# Patient Record
Sex: FEMALE | Race: WHITE | NOT HISPANIC OR LATINO | URBAN - METROPOLITAN AREA
[De-identification: names, ages, dates, MRNs, and addresses within clinical notes are randomized per-mention and may not be internally consistent; named-entity substitution may affect disease eponyms.]

---

## 2023-05-05 ENCOUNTER — EMERGENCY (EMERGENCY)
Facility: HOSPITAL | Age: 73
LOS: 1 days | Discharge: HOME | End: 2023-05-05
Attending: EMERGENCY MEDICINE
Payer: MEDICARE

## 2023-05-05 VITALS
WEIGHT: 143.96 LBS | SYSTOLIC BLOOD PRESSURE: 103 MMHG | TEMPERATURE: 97 F | OXYGEN SATURATION: 95 % | RESPIRATION RATE: 18 BRPM | HEART RATE: 65 BPM | DIASTOLIC BLOOD PRESSURE: 66 MMHG

## 2023-05-05 DIAGNOSIS — K62.5 HEMORRHAGE OF ANUS AND RECTUM: ICD-10-CM

## 2023-05-05 LAB
ALBUMIN SERPL ELPH-MCNC: 4.3 G/DL — SIGNIFICANT CHANGE UP (ref 3.3–5.2)
ALP SERPL-CCNC: 85 U/L — SIGNIFICANT CHANGE UP (ref 40–120)
ALT FLD-CCNC: 38 U/L — HIGH
ANION GAP SERPL CALC-SCNC: 12 MMOL/L — SIGNIFICANT CHANGE UP (ref 5–17)
AST SERPL-CCNC: 44 U/L — HIGH
BASOPHILS # BLD AUTO: 0.03 K/UL — SIGNIFICANT CHANGE UP (ref 0–0.2)
BASOPHILS NFR BLD AUTO: 0.1 % — SIGNIFICANT CHANGE UP (ref 0–2)
BILIRUB SERPL-MCNC: 0.5 MG/DL — SIGNIFICANT CHANGE UP (ref 0.4–2)
BLD GP AB SCN SERPL QL: SIGNIFICANT CHANGE UP
BUN SERPL-MCNC: 27.3 MG/DL — HIGH (ref 8–20)
CALCIUM SERPL-MCNC: 9.6 MG/DL — SIGNIFICANT CHANGE UP (ref 8.4–10.5)
CHLORIDE SERPL-SCNC: 99 MMOL/L — SIGNIFICANT CHANGE UP (ref 96–108)
CO2 SERPL-SCNC: 26 MMOL/L — SIGNIFICANT CHANGE UP (ref 22–29)
CREAT SERPL-MCNC: 0.61 MG/DL — SIGNIFICANT CHANGE UP (ref 0.5–1.3)
EGFR: 94 ML/MIN/1.73M2 — SIGNIFICANT CHANGE UP
EOSINOPHIL # BLD AUTO: 0.04 K/UL — SIGNIFICANT CHANGE UP (ref 0–0.5)
EOSINOPHIL NFR BLD AUTO: 0.2 % — SIGNIFICANT CHANGE UP (ref 0–6)
GLUCOSE SERPL-MCNC: 99 MG/DL — SIGNIFICANT CHANGE UP (ref 70–99)
HCT VFR BLD CALC: 35.3 % — SIGNIFICANT CHANGE UP (ref 34.5–45)
HGB BLD-MCNC: 12.2 G/DL — SIGNIFICANT CHANGE UP (ref 11.5–15.5)
IMM GRANULOCYTES NFR BLD AUTO: 0.8 % — SIGNIFICANT CHANGE UP (ref 0–0.9)
LYMPHOCYTES # BLD AUTO: 10.4 % — LOW (ref 13–44)
LYMPHOCYTES # BLD AUTO: 2.28 K/UL — SIGNIFICANT CHANGE UP (ref 1–3.3)
MCHC RBC-ENTMCNC: 30.9 PG — SIGNIFICANT CHANGE UP (ref 27–34)
MCHC RBC-ENTMCNC: 34.6 GM/DL — SIGNIFICANT CHANGE UP (ref 32–36)
MCV RBC AUTO: 89.4 FL — SIGNIFICANT CHANGE UP (ref 80–100)
MONOCYTES # BLD AUTO: 1.45 K/UL — HIGH (ref 0–0.9)
MONOCYTES NFR BLD AUTO: 6.6 % — SIGNIFICANT CHANGE UP (ref 2–14)
NEUTROPHILS # BLD AUTO: 17.98 K/UL — HIGH (ref 1.8–7.4)
NEUTROPHILS NFR BLD AUTO: 81.9 % — HIGH (ref 43–77)
OB PNL STL: POSITIVE
PLATELET # BLD AUTO: 186 K/UL — SIGNIFICANT CHANGE UP (ref 150–400)
POTASSIUM SERPL-MCNC: 4.3 MMOL/L — SIGNIFICANT CHANGE UP (ref 3.5–5.3)
POTASSIUM SERPL-SCNC: 4.3 MMOL/L — SIGNIFICANT CHANGE UP (ref 3.5–5.3)
PROT SERPL-MCNC: 6.7 G/DL — SIGNIFICANT CHANGE UP (ref 6.6–8.7)
RBC # BLD: 3.95 M/UL — SIGNIFICANT CHANGE UP (ref 3.8–5.2)
RBC # FLD: 12.8 % — SIGNIFICANT CHANGE UP (ref 10.3–14.5)
SODIUM SERPL-SCNC: 137 MMOL/L — SIGNIFICANT CHANGE UP (ref 135–145)
WBC # BLD: 21.95 K/UL — HIGH (ref 3.8–10.5)
WBC # FLD AUTO: 21.95 K/UL — HIGH (ref 3.8–10.5)

## 2023-05-05 PROCEDURE — 36415 COLL VENOUS BLD VENIPUNCTURE: CPT

## 2023-05-05 PROCEDURE — 85025 COMPLETE CBC W/AUTO DIFF WBC: CPT

## 2023-05-05 PROCEDURE — 80053 COMPREHEN METABOLIC PANEL: CPT

## 2023-05-05 PROCEDURE — 86850 RBC ANTIBODY SCREEN: CPT

## 2023-05-05 PROCEDURE — 99284 EMERGENCY DEPT VISIT MOD MDM: CPT

## 2023-05-05 PROCEDURE — 82272 OCCULT BLD FECES 1-3 TESTS: CPT

## 2023-05-05 PROCEDURE — 99283 EMERGENCY DEPT VISIT LOW MDM: CPT

## 2023-05-05 PROCEDURE — 86900 BLOOD TYPING SEROLOGIC ABO: CPT

## 2023-05-05 PROCEDURE — 86901 BLOOD TYPING SEROLOGIC RH(D): CPT

## 2023-05-05 NOTE — ED STATDOCS - OBJECTIVE STATEMENT
71 y/o female with PMHx of HTN on Losartan 50mg Amlodipine 10mg, HLD, presents to the ED c/o rectal bleeding. Pt states yesterday she had episode of bloody diarrhea, woke up today and again had another episode of rectal bleeding, called her PMD who advised her to go to the ED, also made appointment with GI for next Monday. Pt notes mild abdominal pain. Pt denies fevers, lightheadedness, dizziness, SOB, palpitations.  Pt take baby aspirin daily, no blood thinner or other anti-platelet therapy. Pt denies past abdominal surgeries. Pt denies recent travel, denies recent antibiotics use. Pt with hx of one blood transfusion secondary to trauma. Last colonoscopy was about 7 years ago. 71 y/o female with PMHx of HTN on Losartan 50mg Amlodipine 10mg, HLD, presents to the ED c/o rectal bleeding. Pt states yesterday she had episodes of bloody diarrhea, woke up today and again had another episode of rectal bleeding, called her PMD who advised her to go to the ED, also made appointment with GI for next Monday (in [place]). Pt notes mild abdominal pain. Pt denies fevers, lightheadedness, dizziness, SOB, palpitations.  Pt take baby aspirin daily, no blood thinner or other anti-platelet therapy. Pt denies past abdominal surgeries. Pt denies recent travel, denies recent antibiotics use. Pt with hx of one blood transfusion secondary to trauma. Last colonoscopy was about 7 years ago.

## 2023-05-05 NOTE — ED STATDOCS - PATIENT PORTAL LINK FT
You can access the FollowMyHealth Patient Portal offered by [facility] by registering at the following website: [email or web address]. By joining [employer]’s FollowMyHealth portal, you will also be able to view your health information using other applications (apps) compatible with our system.

## 2023-05-05 NOTE — CONSULT NOTE ADULT - PROBLEM SELECTOR RECOMMENDATION 2
Seems to have subsided   likely  infectious gastroenteritis, less likely ischemic colitis- now resolving    low fiber diet   If diarrhea resumes, pt should be on clear liquids  and obtain stool studies  for GI PCR, giardia, ova and parasites, C diff   Ct scan ordered. Should be sent home on augmentin X 7 days.  Has appointment with [facility] GI on monday already scheduled

## 2023-05-05 NOTE — CONSULT NOTE ADULT - SUBJECTIVE AND OBJECTIVE BOX
Patient is a 72y old  Female who presents with a chief complaint of     HPI:  Patient with hx of HTN. States yesterday, she had diarrhea and vomiting at the same time. NO hematemesis. Diarrhea was "uncontrollable ". More than 10 BM. Diarrhea became mixed with blood. Mild cramping. NO fevers. Did not eat anything unusual. No sick contacts, no recent travel or antibiotics. Had no diarrhea overnight. This am one semiformed stool , wiped and saw blood. NO further vomiting. Last colon 7 years ago in [facility] was negative as per pt.  In, ER, pt with leukocytosis of 21,000      REVIEW OF SYSTEMS:  Constitutional: No fever, weight loss or fatigue  ENMT:  No difficulty hearing, tinnitus, vertigo; No sinus or throat pain  Respiratory: No cough, wheezing, chills or hemoptysis  Cardiovascular: No chest pain, palpitations, dizziness or leg swelling  Gastrointestinal: No abdominal or epigastric pain. No nausea, vomiting or hematemesis; + diarrhea or constipation. +r hematochezia.  Skin: No itching, burning, rashes or lesions   Musculoskeletal: No joint pain or swelling; No muscle, back or extremity pain    PAST MEDICAL & SURGICAL HISTORY:  HTN (hypertension)      No significant past surgical history          FAMILY HISTORY:      SOCIAL HISTORY:  Smoking Status: [ ] Current, [ ] Former, [ ] Never  Pack Years:  [  ] EtOH-no  [  ] IVDA    MEDICATIONS:  MEDICATIONS  (STANDING):    MEDICATIONS  (PRN):      Allergies    No Known Allergies    Intolerances        Vital Signs Last 24 Hrs  T(C): 36.3 (05 May 2023 12:56), Max: 36.3 (05 May 2023 12:56)  T(F): 97.3 (05 May 2023 12:56), Max: 97.3 (05 May 2023 12:56)  HR: 65 (05 May 2023 12:56) (65 - 65)  BP: 103/66 (05 May 2023 12:56) (103/66 - 103/66)  BP(mean): --  RR: 18 (05 May 2023 12:56) (18 - 18)  SpO2: 95% (05 May 2023 12:56) (95% - 95%)    Parameters below as of 05 May 2023 12:56  Patient On (Oxygen Delivery Method): room air            PHYSICAL EXAM:    General:  in no acute distress  HEENT: MMM, conjunctiva and sclera clear  H-RRR  L-CTA  Gastrointestinal: Soft, non-tender non-distended; Normal bowel sounds; No rebound or guarding  Extremities: Normal range of motion, No clubbing, cyanosis or edema  Neurological: Alert and oriented x3  Skin: Warm and dry. No obvious rash  rectal-  unable to do rectal as pt is in open area of the ER. As per ER note, pt had blood on rectal exam       LABS:                        12.2   21.95 )-----------( 186      ( 05 May 2023 13:42 )             35.3     05 May 2023 13:42    137    |  99     |  27.3   ----------------------------<  99     4.3     |  26.0   |  0.61     Ca    9.6        05 May 2023 13:42    TPro  6.7    /  Alb  4.3    /  TBili  0.5    /  DBili  x      /  AST  44     /  ALT  38     /  AlkPhos  85     / Amylase x      /Lipase x      05 May 2023 13:42              RADIOLOGY & ADDITIONAL STUDIES:

## 2023-05-05 NOTE — ED ADULT TRIAGE NOTE - NS ED TRIAGE AVPU SCALE
I have reviewed discharge instructions with the patient. The patient verbalized understanding.
Alert-The patient is alert, awake and responds to voice. The patient is oriented to time, place, and person. The triage nurse is able to obtain subjective information.

## 2023-05-05 NOTE — ED ADULT NURSE NOTE - OBJECTIVE STATEMENT
Pt comes in complaining of BRBPR x 1 day. Pt states yesterday she had multiple episodes of bloody stool for 2.5 hours. Pt denies dizziness, pain, CP, SOB.

## 2023-05-05 NOTE — ED STATDOCS - PHYSICAL EXAMINATION
Gen: Non-toxic appearing in NAD  Eyes: Pink conjunctiva, no scleral icterus,  Neck: supple, no lymphadenopathy  Card: regular rate and rhythm, no obvious murmur  Resp: Lungs clear bilaterally   Abd: soft, dull to percussion, non-distended, mild LUQ tenderness, no rebound, rigidity, or guarding   Rectal: chaperoned by CNA: Yesenia Candelario: external hemorrhoid, no tenderness, no masses, gross blood in rectum

## 2023-05-05 NOTE — ED STATDOCS - NS_ ATTENDINGSCRIBEDETAILS _ED_A_ED_FT
I, Israel Fierro, performed the initial face to face bedside interview with this patient regarding history of present illness, review of symptoms and relevant past medical, social and family history.  I completed an independent physical examination.  I was the provider who initially evaluated this patient.  The history, relevant review of systems, past medical and surgical history, medical decision making, and physical examination was documented by the scribe in my presence and I attest to the accuracy of the documentation. Follow-up on ordered tests (ie labs, radiologic studies) and re-evaluation of the patient's status has been communicated to the ACP.  Disposition of the patient will be based on test outcome and response to ED interventions.

## 2023-05-05 NOTE — ED ADULT TRIAGE NOTE - CHIEF COMPLAINT QUOTE
Pt reports she has gastro apt on Monday for BRB per rectum with mild diarrhea and mild lower abd cramping yesterday. Pt old to come to ER for eval by her gastro. Pt reports she has had little to no symptoms today.

## 2023-05-05 NOTE — ED STATDOCS - CLINICAL SUMMARY MEDICAL DECISION MAKING FREE TEXT BOX
Lower GI bleeding, possible bleeding diverticulosis, will obtain labs, GI consult, admit Lower GI bleeding: possible bleeding diverticulosis.  labs, GI consult, admit Lower GI bleeding: possible bleeding diverticulosis.  labs, GI consult, admit      Pt with multiple episodes of BRBPR, seen on exam and fecal occult positive, pt with stable h&H but possible diverticula bleed, pt did not want to wait for CT, notes she has an appt with gastro on monday, I had a lengthy discussion with patient, and the patient wishes to leave at this time.The patient understands that he/she is leaving against medical advice despite the risk of missing a potential serious diagnosis which may lead to injury, disability and/or death. I discussed with the patient which tests would need to be performed and what type of monitoring would be necessary for the patient as well. I was unable to convice the patient to stay for further work-up.The patient is alert and oriented and demonstrates competence in making medical decisions. Pt left prior to signing paperwork, iv pulled by nurse

## 2024-02-13 NOTE — ED ADULT NURSE NOTE - PAIN RATING/NUMBER SCALE (0-10): ACTIVITY
Called and spoke to medical records through [employer], [phone].  They will fax report of u/s.  Re: disk, they will contact writer tomorrow re: how to send or push through images.    
Report sent to scanning.  Pt will have to sign a release and  the disk. Pt aware and will drop off the disk next week.  No questions at this time.   
0 (no pain/absence of nonverbal indicators of pain)

## 2025-02-04 PROBLEM — Z00.00 ENCOUNTER FOR PREVENTIVE HEALTH EXAMINATION: Status: ACTIVE | Noted: 2025-02-04

## 2025-02-10 ENCOUNTER — APPOINTMENT (OUTPATIENT)
Dept: ORTHOPEDIC SURGERY | Facility: CLINIC | Age: 75
End: 2025-02-10
Payer: MEDICARE

## 2025-02-10 VITALS — WEIGHT: 133 LBS | HEIGHT: 64 IN | BODY MASS INDEX: 22.71 KG/M2

## 2025-02-10 DIAGNOSIS — M67.449 GANGLION, UNSPECIFIED HAND: ICD-10-CM

## 2025-02-10 DIAGNOSIS — E78.00 PURE HYPERCHOLESTEROLEMIA, UNSPECIFIED: ICD-10-CM

## 2025-02-10 DIAGNOSIS — I10 ESSENTIAL (PRIMARY) HYPERTENSION: ICD-10-CM

## 2025-02-10 PROCEDURE — 99203 OFFICE O/P NEW LOW 30 MIN: CPT | Mod: 25

## 2025-02-10 PROCEDURE — 20612 ASPIRATE/INJ GANGLION CYST: CPT | Mod: LT

## 2025-02-10 RX ORDER — SIMVASTATIN 80 MG/1
TABLET, FILM COATED ORAL
Refills: 0 | Status: ACTIVE | COMMUNITY

## 2025-02-10 RX ORDER — LOSARTAN POTASSIUM AND HYDROCHLOROTHIAZIDE 12.5; 1 MG/1; MG/1
TABLET ORAL
Refills: 0 | Status: ACTIVE | COMMUNITY